# Patient Record
Sex: MALE | Race: WHITE | NOT HISPANIC OR LATINO | Employment: FULL TIME | URBAN - METROPOLITAN AREA
[De-identification: names, ages, dates, MRNs, and addresses within clinical notes are randomized per-mention and may not be internally consistent; named-entity substitution may affect disease eponyms.]

---

## 2022-01-07 ENCOUNTER — HOSPITAL ENCOUNTER (OUTPATIENT)
Facility: HOSPITAL | Age: 52
Discharge: HOME | End: 2022-01-08
Attending: EMERGENCY MEDICINE | Admitting: EMERGENCY MEDICINE
Payer: COMMERCIAL

## 2022-01-07 DIAGNOSIS — K62.5 BRIGHT RED BLOOD PER RECTUM: ICD-10-CM

## 2022-01-07 DIAGNOSIS — R79.89 ELEVATED LFTS: ICD-10-CM

## 2022-01-07 DIAGNOSIS — E87.20 LACTIC ACIDOSIS: ICD-10-CM

## 2022-01-07 DIAGNOSIS — R05.9 COUGH: ICD-10-CM

## 2022-01-07 DIAGNOSIS — K92.0 HEMATEMESIS WITH NAUSEA: Primary | ICD-10-CM

## 2022-01-07 DIAGNOSIS — R79.1 ELEVATED INR: ICD-10-CM

## 2022-01-07 PROBLEM — Z72.0 TOBACCO USER: Status: ACTIVE | Noted: 2022-01-07

## 2022-01-07 PROBLEM — F10.20 ALCOHOL DEPENDENCE: Chronic | Status: ACTIVE | Noted: 2022-01-07

## 2022-01-07 PROBLEM — Z72.0 TOBACCO USER: Chronic | Status: ACTIVE | Noted: 2022-01-07

## 2022-01-07 PROBLEM — F31.9 BIPOLAR DISORDER: Status: ACTIVE | Noted: 2022-01-07

## 2022-01-07 PROBLEM — E66.9 CLASS 1 OBESITY IN ADULT: Chronic | Status: ACTIVE | Noted: 2022-01-07

## 2022-01-07 PROBLEM — F31.9 BIPOLAR DISORDER: Chronic | Status: ACTIVE | Noted: 2022-01-07

## 2022-01-07 PROBLEM — K21.9 GASTROESOPHAGEAL REFLUX DISEASE: Status: ACTIVE | Noted: 2020-04-02

## 2022-01-07 PROBLEM — K21.9 GASTROESOPHAGEAL REFLUX DISEASE: Chronic | Status: ACTIVE | Noted: 2020-04-02

## 2022-01-07 LAB
ABO + RH BLD: NORMAL
ALBUMIN SERPL BCP-MCNC: 4 G/DL (ref 3.5–5.2)
ALP SERPL-CCNC: 87 U/L (ref 55–135)
ALT SERPL W/O P-5'-P-CCNC: 52 U/L (ref 10–44)
ANION GAP SERPL CALC-SCNC: 17 MMOL/L (ref 8–16)
AST SERPL-CCNC: 44 U/L (ref 10–40)
BASOPHILS # BLD AUTO: 0.07 K/UL (ref 0–0.2)
BASOPHILS # BLD AUTO: 0.08 K/UL (ref 0–0.2)
BASOPHILS NFR BLD: 0.6 % (ref 0–1.9)
BASOPHILS NFR BLD: 1 % (ref 0–1.9)
BILIRUB SERPL-MCNC: 1 MG/DL (ref 0.1–1)
BILIRUB UR QL STRIP: NEGATIVE
BLD GP AB SCN CELLS X3 SERPL QL: NORMAL
BUN SERPL-MCNC: 11 MG/DL (ref 6–20)
CALCIUM SERPL-MCNC: 8.9 MG/DL (ref 8.7–10.5)
CHLORIDE SERPL-SCNC: 104 MMOL/L (ref 95–110)
CLARITY UR: CLEAR
CO2 SERPL-SCNC: 22 MMOL/L (ref 23–29)
COLOR UR: YELLOW
CREAT SERPL-MCNC: 0.9 MG/DL (ref 0.5–1.4)
CTP QC/QA: YES
DIFFERENTIAL METHOD: ABNORMAL
DIFFERENTIAL METHOD: NORMAL
EOSINOPHIL # BLD AUTO: 0 K/UL (ref 0–0.5)
EOSINOPHIL # BLD AUTO: 0 K/UL (ref 0–0.5)
EOSINOPHIL NFR BLD: 0.1 % (ref 0–8)
EOSINOPHIL NFR BLD: 0.2 % (ref 0–8)
ERYTHROCYTE [DISTWIDTH] IN BLOOD BY AUTOMATED COUNT: 13.1 % (ref 11.5–14.5)
ERYTHROCYTE [DISTWIDTH] IN BLOOD BY AUTOMATED COUNT: 13.1 % (ref 11.5–14.5)
EST. GFR  (AFRICAN AMERICAN): >60 ML/MIN/1.73 M^2
EST. GFR  (NON AFRICAN AMERICAN): >60 ML/MIN/1.73 M^2
ETHANOL SERPL-MCNC: 274 MG/DL
GLUCOSE SERPL-MCNC: 111 MG/DL (ref 70–110)
GLUCOSE UR QL STRIP: NEGATIVE
HCT VFR BLD AUTO: 45.8 % (ref 40–54)
HCT VFR BLD AUTO: 50.3 % (ref 40–54)
HGB BLD-MCNC: 15.9 G/DL (ref 14–18)
HGB BLD-MCNC: 17.6 G/DL (ref 14–18)
HGB UR QL STRIP: NEGATIVE
IMM GRANULOCYTES # BLD AUTO: 0.02 K/UL (ref 0–0.04)
IMM GRANULOCYTES # BLD AUTO: 0.02 K/UL (ref 0–0.04)
IMM GRANULOCYTES NFR BLD AUTO: 0.2 % (ref 0–0.5)
IMM GRANULOCYTES NFR BLD AUTO: 0.2 % (ref 0–0.5)
INR PPP: 2.7 (ref 0.8–1.2)
KETONES UR QL STRIP: NEGATIVE
LACTATE SERPL-SCNC: 3.7 MMOL/L (ref 0.5–2.2)
LACTATE SERPL-SCNC: 5.4 MMOL/L (ref 0.5–2.2)
LACTATE SERPL-SCNC: 6.1 MMOL/L (ref 0.5–2.2)
LEUKOCYTE ESTERASE UR QL STRIP: NEGATIVE
LIPASE SERPL-CCNC: 28 U/L (ref 4–60)
LYMPHOCYTES # BLD AUTO: 2.5 K/UL (ref 1–4.8)
LYMPHOCYTES # BLD AUTO: 2.8 K/UL (ref 1–4.8)
LYMPHOCYTES NFR BLD: 21.3 % (ref 18–48)
LYMPHOCYTES NFR BLD: 33.8 % (ref 18–48)
MCH RBC QN AUTO: 30.9 PG (ref 27–31)
MCH RBC QN AUTO: 31 PG (ref 27–31)
MCHC RBC AUTO-ENTMCNC: 34.7 G/DL (ref 32–36)
MCHC RBC AUTO-ENTMCNC: 35 G/DL (ref 32–36)
MCV RBC AUTO: 88 FL (ref 82–98)
MCV RBC AUTO: 89 FL (ref 82–98)
MONOCYTES # BLD AUTO: 0.5 K/UL (ref 0.3–1)
MONOCYTES # BLD AUTO: 0.7 K/UL (ref 0.3–1)
MONOCYTES NFR BLD: 5.9 % (ref 4–15)
MONOCYTES NFR BLD: 6.1 % (ref 4–15)
NEUTROPHILS # BLD AUTO: 4.8 K/UL (ref 1.8–7.7)
NEUTROPHILS # BLD AUTO: 8.4 K/UL (ref 1.8–7.7)
NEUTROPHILS NFR BLD: 58.7 % (ref 38–73)
NEUTROPHILS NFR BLD: 71.9 % (ref 38–73)
NITRITE UR QL STRIP: NEGATIVE
NRBC BLD-RTO: 0 /100 WBC
NRBC BLD-RTO: 0 /100 WBC
PH UR STRIP: 6 [PH] (ref 5–8)
PLATELET # BLD AUTO: 372 K/UL (ref 150–450)
PLATELET # BLD AUTO: 406 K/UL (ref 150–450)
PMV BLD AUTO: 9.5 FL (ref 9.2–12.9)
PMV BLD AUTO: 9.7 FL (ref 9.2–12.9)
POTASSIUM SERPL-SCNC: 3.6 MMOL/L (ref 3.5–5.1)
PROT SERPL-MCNC: 7.9 G/DL (ref 6–8.4)
PROT UR QL STRIP: ABNORMAL
PROTHROMBIN TIME: 27.2 SEC (ref 9–12.5)
RBC # BLD AUTO: 5.13 M/UL (ref 4.6–6.2)
RBC # BLD AUTO: 5.7 M/UL (ref 4.6–6.2)
SARS-COV-2 RDRP RESP QL NAA+PROBE: NEGATIVE
SODIUM SERPL-SCNC: 143 MMOL/L (ref 136–145)
SP GR UR STRIP: 1.03 (ref 1–1.03)
URN SPEC COLLECT METH UR: ABNORMAL
UROBILINOGEN UR STRIP-ACNC: NEGATIVE EU/DL
WBC # BLD AUTO: 11.65 K/UL (ref 3.9–12.7)
WBC # BLD AUTO: 8.25 K/UL (ref 3.9–12.7)

## 2022-01-07 PROCEDURE — 82077 ASSAY SPEC XCP UR&BREATH IA: CPT | Performed by: EMERGENCY MEDICINE

## 2022-01-07 PROCEDURE — 96366 THER/PROPH/DIAG IV INF ADDON: CPT

## 2022-01-07 PROCEDURE — 83690 ASSAY OF LIPASE: CPT | Performed by: EMERGENCY MEDICINE

## 2022-01-07 PROCEDURE — 86850 RBC ANTIBODY SCREEN: CPT | Performed by: HOSPITALIST

## 2022-01-07 PROCEDURE — 96365 THER/PROPH/DIAG IV INF INIT: CPT

## 2022-01-07 PROCEDURE — C9113 INJ PANTOPRAZOLE SODIUM, VIA: HCPCS | Performed by: HOSPITALIST

## 2022-01-07 PROCEDURE — G0378 HOSPITAL OBSERVATION PER HR: HCPCS

## 2022-01-07 PROCEDURE — 25500020 PHARM REV CODE 255: Performed by: EMERGENCY MEDICINE

## 2022-01-07 PROCEDURE — 96375 TX/PRO/DX INJ NEW DRUG ADDON: CPT | Mod: 59

## 2022-01-07 PROCEDURE — 63600175 PHARM REV CODE 636 W HCPCS: Performed by: HOSPITALIST

## 2022-01-07 PROCEDURE — 96361 HYDRATE IV INFUSION ADD-ON: CPT

## 2022-01-07 PROCEDURE — 63600175 PHARM REV CODE 636 W HCPCS: Performed by: EMERGENCY MEDICINE

## 2022-01-07 PROCEDURE — 25000003 PHARM REV CODE 250: Performed by: EMERGENCY MEDICINE

## 2022-01-07 PROCEDURE — 83605 ASSAY OF LACTIC ACID: CPT | Performed by: EMERGENCY MEDICINE

## 2022-01-07 PROCEDURE — C9113 INJ PANTOPRAZOLE SODIUM, VIA: HCPCS | Performed by: EMERGENCY MEDICINE

## 2022-01-07 PROCEDURE — 81003 URINALYSIS AUTO W/O SCOPE: CPT | Performed by: EMERGENCY MEDICINE

## 2022-01-07 PROCEDURE — 25000003 PHARM REV CODE 250: Performed by: HOSPITALIST

## 2022-01-07 PROCEDURE — 85025 COMPLETE CBC W/AUTO DIFF WBC: CPT | Mod: 91 | Performed by: HOSPITALIST

## 2022-01-07 PROCEDURE — 99291 CRITICAL CARE FIRST HOUR: CPT | Mod: 25

## 2022-01-07 PROCEDURE — U0002 COVID-19 LAB TEST NON-CDC: HCPCS | Performed by: EMERGENCY MEDICINE

## 2022-01-07 PROCEDURE — 83605 ASSAY OF LACTIC ACID: CPT | Mod: 91 | Performed by: HOSPITALIST

## 2022-01-07 PROCEDURE — 96376 TX/PRO/DX INJ SAME DRUG ADON: CPT

## 2022-01-07 PROCEDURE — 80053 COMPREHEN METABOLIC PANEL: CPT | Performed by: EMERGENCY MEDICINE

## 2022-01-07 PROCEDURE — 99204 PR OFFICE/OUTPT VISIT, NEW, LEVL IV, 45-59 MIN: ICD-10-PCS | Mod: ,,, | Performed by: INTERNAL MEDICINE

## 2022-01-07 PROCEDURE — 85610 PROTHROMBIN TIME: CPT | Performed by: EMERGENCY MEDICINE

## 2022-01-07 PROCEDURE — 99204 OFFICE O/P NEW MOD 45 MIN: CPT | Mod: ,,, | Performed by: INTERNAL MEDICINE

## 2022-01-07 PROCEDURE — 85025 COMPLETE CBC W/AUTO DIFF WBC: CPT | Performed by: EMERGENCY MEDICINE

## 2022-01-07 RX ORDER — PANTOPRAZOLE SODIUM 40 MG/10ML
40 INJECTION, POWDER, LYOPHILIZED, FOR SOLUTION INTRAVENOUS
Status: COMPLETED | OUTPATIENT
Start: 2022-01-07 | End: 2022-01-07

## 2022-01-07 RX ORDER — OMEPRAZOLE 20 MG/1
20 CAPSULE, DELAYED RELEASE ORAL DAILY
COMMUNITY

## 2022-01-07 RX ORDER — MORPHINE SULFATE 4 MG/ML
4 INJECTION, SOLUTION INTRAMUSCULAR; INTRAVENOUS ONCE
Status: COMPLETED | OUTPATIENT
Start: 2022-01-07 | End: 2022-01-07

## 2022-01-07 RX ORDER — FOLIC ACID 1 MG/1
1 TABLET ORAL DAILY
Status: DISCONTINUED | OUTPATIENT
Start: 2022-01-08 | End: 2022-01-08 | Stop reason: HOSPADM

## 2022-01-07 RX ORDER — PANTOPRAZOLE SODIUM 40 MG/10ML
40 INJECTION, POWDER, LYOPHILIZED, FOR SOLUTION INTRAVENOUS 2 TIMES DAILY
Status: DISCONTINUED | OUTPATIENT
Start: 2022-01-07 | End: 2022-01-08 | Stop reason: HOSPADM

## 2022-01-07 RX ORDER — OXCARBAZEPINE 600 MG/1
600 TABLET, FILM COATED ORAL 2 TIMES DAILY
COMMUNITY
End: 2022-01-07

## 2022-01-07 RX ORDER — LORAZEPAM 2 MG/ML
1 INJECTION INTRAMUSCULAR
Status: COMPLETED | OUTPATIENT
Start: 2022-01-07 | End: 2022-01-07

## 2022-01-07 RX ORDER — SODIUM CHLORIDE 9 MG/ML
INJECTION, SOLUTION INTRAVENOUS CONTINUOUS
Status: DISCONTINUED | OUTPATIENT
Start: 2022-01-07 | End: 2022-01-08 | Stop reason: HOSPADM

## 2022-01-07 RX ORDER — LANOLIN ALCOHOL/MO/W.PET/CERES
100 CREAM (GRAM) TOPICAL DAILY
Status: DISCONTINUED | OUTPATIENT
Start: 2022-01-08 | End: 2022-01-08 | Stop reason: HOSPADM

## 2022-01-07 RX ORDER — MORPHINE SULFATE 4 MG/ML
6 INJECTION, SOLUTION INTRAMUSCULAR; INTRAVENOUS
Status: COMPLETED | OUTPATIENT
Start: 2022-01-07 | End: 2022-01-07

## 2022-01-07 RX ORDER — OCTREOTIDE ACETATE 100 UG/ML
100 INJECTION, SOLUTION INTRAVENOUS; SUBCUTANEOUS
Status: COMPLETED | OUTPATIENT
Start: 2022-01-07 | End: 2022-01-07

## 2022-01-07 RX ORDER — CIPROFLOXACIN 2 MG/ML
400 INJECTION, SOLUTION INTRAVENOUS
Status: DISCONTINUED | OUTPATIENT
Start: 2022-01-07 | End: 2022-01-08 | Stop reason: HOSPADM

## 2022-01-07 RX ORDER — IBUPROFEN 200 MG
200 TABLET ORAL EVERY 6 HOURS PRN
Status: ON HOLD | COMMUNITY
End: 2022-01-08 | Stop reason: HOSPADM

## 2022-01-07 RX ORDER — ONDANSETRON 2 MG/ML
4 INJECTION INTRAMUSCULAR; INTRAVENOUS
Status: COMPLETED | OUTPATIENT
Start: 2022-01-07 | End: 2022-01-07

## 2022-01-07 RX ORDER — OXCARBAZEPINE 150 MG/1
600 TABLET, FILM COATED ORAL 2 TIMES DAILY
Status: DISCONTINUED | OUTPATIENT
Start: 2022-01-07 | End: 2022-01-08 | Stop reason: HOSPADM

## 2022-01-07 RX ORDER — GABAPENTIN 300 MG/1
300 CAPSULE ORAL 2 TIMES DAILY
Status: DISCONTINUED | OUTPATIENT
Start: 2022-01-07 | End: 2022-01-08 | Stop reason: HOSPADM

## 2022-01-07 RX ORDER — DIAZEPAM 5 MG/1
5 TABLET ORAL EVERY 8 HOURS
Status: DISCONTINUED | OUTPATIENT
Start: 2022-01-07 | End: 2022-01-08 | Stop reason: HOSPADM

## 2022-01-07 RX ORDER — MORPHINE SULFATE 4 MG/ML
4 INJECTION, SOLUTION INTRAMUSCULAR; INTRAVENOUS
Status: COMPLETED | OUTPATIENT
Start: 2022-01-07 | End: 2022-01-07

## 2022-01-07 RX ORDER — GABAPENTIN 300 MG/1
300 CAPSULE ORAL
COMMUNITY
Start: 2021-12-15

## 2022-01-07 RX ADMIN — MORPHINE SULFATE 4 MG: 4 INJECTION, SOLUTION INTRAMUSCULAR; INTRAVENOUS at 01:01

## 2022-01-07 RX ADMIN — SODIUM CHLORIDE: 0.9 INJECTION, SOLUTION INTRAVENOUS at 06:01

## 2022-01-07 RX ADMIN — SODIUM CHLORIDE, SODIUM LACTATE, POTASSIUM CHLORIDE, AND CALCIUM CHLORIDE 1000 ML: .6; .31; .03; .02 INJECTION, SOLUTION INTRAVENOUS at 02:01

## 2022-01-07 RX ADMIN — OXCARBAZEPINE 600 MG: 150 TABLET, FILM COATED ORAL at 09:01

## 2022-01-07 RX ADMIN — MORPHINE SULFATE 4 MG: 4 INJECTION, SOLUTION INTRAMUSCULAR; INTRAVENOUS at 06:01

## 2022-01-07 RX ADMIN — PANTOPRAZOLE SODIUM 40 MG: 40 INJECTION, POWDER, FOR SOLUTION INTRAVENOUS at 03:01

## 2022-01-07 RX ADMIN — LORAZEPAM 1 MG: 2 INJECTION INTRAMUSCULAR; INTRAVENOUS at 04:01

## 2022-01-07 RX ADMIN — PANTOPRAZOLE SODIUM 40 MG: 40 INJECTION, POWDER, FOR SOLUTION INTRAVENOUS at 09:01

## 2022-01-07 RX ADMIN — OCTREOTIDE ACETATE 50 MCG/HR: 500 INJECTION, SOLUTION INTRAVENOUS; SUBCUTANEOUS at 04:01

## 2022-01-07 RX ADMIN — SODIUM CHLORIDE 1000 ML: 0.9 INJECTION, SOLUTION INTRAVENOUS at 01:01

## 2022-01-07 RX ADMIN — ONDANSETRON 4 MG: 2 INJECTION INTRAMUSCULAR; INTRAVENOUS at 01:01

## 2022-01-07 RX ADMIN — LORAZEPAM 1 MG: 2 INJECTION INTRAMUSCULAR; INTRAVENOUS at 03:01

## 2022-01-07 RX ADMIN — MORPHINE SULFATE 6 MG: 4 INJECTION, SOLUTION INTRAMUSCULAR; INTRAVENOUS at 03:01

## 2022-01-07 RX ADMIN — GABAPENTIN 300 MG: 300 CAPSULE ORAL at 09:01

## 2022-01-07 RX ADMIN — SODIUM CHLORIDE, SODIUM LACTATE, POTASSIUM CHLORIDE, AND CALCIUM CHLORIDE 1000 ML: .6; .31; .03; .02 INJECTION, SOLUTION INTRAVENOUS at 03:01

## 2022-01-07 RX ADMIN — OCTREOTIDE ACETATE 100 MCG: 100 INJECTION, SOLUTION INTRAVENOUS; SUBCUTANEOUS at 04:01

## 2022-01-07 RX ADMIN — DIAZEPAM 5 MG: 5 TABLET ORAL at 09:01

## 2022-01-07 RX ADMIN — IOHEXOL 100 ML: 350 INJECTION, SOLUTION INTRAVENOUS at 02:01

## 2022-01-07 NOTE — PHARMACY MED REC
"  Admission Medication History     The home medication history was taken by Julianne Randall CPhT.      You may go to "Admission" then "Reconcile Home Medications" tabs to review and/or act upon these items.      The home medication list has been updated by the Pharmacy department.    Please read ALL comments highlighted in yellow.    Please address this information as you see fit.     Feel free to contact us if you have any questions or require assistance.      The medications listed below were removed from the home medication list. Please reorder if appropriate:  Patient reports no longer taking the following medication(s):   Oxcarbazepine 600 mg          Patient verified taking Gabapentin po PRN about 2-3 weeks ago.    Patient takes Motrin OTC for pain and Omeprazole OTC about a week ago.          Julianne Randall CPhT.  [phone]                  .        "

## 2022-01-07 NOTE — ED PROVIDER NOTES
Encounter Date: 1/7/2022    SCRIBE #1 NOTE: I, Lara Roman, am scribing for, and in the presence of,  Wally Flanagan Jr., MD. I have scribed the following portions of the note - Other sections scribed: HPI, ROS.       History     Chief Complaint   Patient presents with    Abdominal Pain     EMS called to 50yo male with lower abdominal pain, vomiting blood and having bloody stools x3 days. Hx of heavy alcohol use per patient. EMS stated that he had a syncopal episode enroute that lasted about a minute and then he woke up and was alert and oriented x4 till arrival and at triage.      This 51 y.o male, with no medical history, presents to the ED via EMS transportation c/o bloody stools and emesis with associated lower abdominal pain for the last 3-4 days. Pt reports experiencing 1-2 episodes of bloody stools and 5-6 episodes of hematemesis per day since the onset of symptoms. He states that he has also been coughing up blood. The symptoms are acute, constant and severe (10/10). Pt reports that he has experienced similar bleeding in the past due to hemorrhoids, but notes that it has never been this severe. Pt is not followed by gastroenterology. Of note, he states that he has been consuming a 1/2 pint of alcohol per day for years. Pt consumed his last drink yesterday. He denies a history of alcohol withdrawal requiring hospitalization. He also denies shortness of breath, chest pain or any other associated symptoms. No alleviating factors.    The history is provided by the patient.     Review of patient's allergies indicates:   Allergen Reactions    Penicillins      Past Medical History:   Diagnosis Date    Alcohol abuse     Bloody stools     Hematemesis/vomiting blood      Past Surgical History:   Procedure Laterality Date    APPENDECTOMY      CHOLECYSTECTOMY      FOOT SURGERY Right     KNEE SURGERY Right      History reviewed. No pertinent family history.  Social History     Tobacco Use    Smoking  status: Current Every Day Smoker     Packs/day: 0.50     Types: Cigarettes    Smokeless tobacco: Never Used   Substance Use Topics    Alcohol use: Yes     Comment: 1/2 - 1 pint daily    Drug use: Not Currently     Review of Systems   Constitutional: Negative for fever.   HENT: Negative for sore throat.    Respiratory: Negative for shortness of breath.         (+) hemoptysis   Cardiovascular: Negative for chest pain.   Gastrointestinal: Positive for abdominal pain (lower), blood in stool and vomiting (with blood).   Genitourinary: Negative for dysuria.   Musculoskeletal: Negative for back pain.   Skin: Negative for rash.   Neurological: Negative for weakness.       Physical Exam     Initial Vitals [01/07/22 1231]   BP Pulse Resp Temp SpO2   134/87 85 16 97.9 °F (36.6 °C) 98 %      MAP       --         Physical Exam    Nursing note and vitals reviewed.  Constitutional: He appears well-developed and well-nourished. He is not diaphoretic. No distress.   HENT:   Head: Normocephalic and atraumatic.   Right Ear: External ear normal.   Left Ear: External ear normal.   Nose: Nose normal.   Mouth/Throat: Oropharynx is clear and moist.   Eyes: Conjunctivae and EOM are normal. Pupils are equal, round, and reactive to light. Right eye exhibits no discharge. Left eye exhibits no discharge. No scleral icterus.   Neck: Neck supple. No JVD present.   Normal range of motion.  Cardiovascular: Normal rate, regular rhythm, normal heart sounds and intact distal pulses. Exam reveals no gallop and no friction rub.    No murmur heard.  Pulmonary/Chest: Breath sounds normal. No stridor. No respiratory distress. He has no wheezes. He has no rhonchi. He has no rales. He exhibits no tenderness.   Abdominal: Abdomen is soft. Bowel sounds are normal. He exhibits no distension and no mass. There is abdominal tenderness.   Patient's abdomen is soft and nontender.  There is a surgical scar in the right lower quadrant is consistent with  appendectomy.  The patient does have some tenderness in the lower abdomen mostly in that it mid suprapubic area. There is no rebound and no guarding.   Musculoskeletal:         General: No tenderness or edema. Normal range of motion.      Cervical back: Normal range of motion and neck supple.     Neurological: He is alert and oriented to person, place, and time. He has normal strength. No cranial nerve deficit or sensory deficit.   Skin: Skin is warm and dry. No rash noted. No erythema. No pallor.   Psychiatric: He has a normal mood and affect. His behavior is normal. Judgment and thought content normal.         ED Course   Critical Care    Date/Time: 1/7/2022 4:19 PM  Performed by: Wally Flanagan Jr., MD  Authorized by: Wally Flanagan Jr., MD   Direct patient critical care time: 15 minutes  Additional history critical care time: 10 minutes  Ordering / reviewing critical care time: 5 minutes  Documentation critical care time: 5 minutes  Consulting other physicians critical care time: 5 minutes  Total critical care time (exclusive of procedural time) : 40 minutes  Critical care was necessary to treat or prevent imminent or life-threatening deterioration of the following conditions: dehydration.  Critical care was time spent personally by me on the following activities: development of treatment plan with patient or surrogate, discussions with consultants, interpretation of cardiac output measurements, evaluation of patient's response to treatment, review of old charts, re-evaluation of patient's condition, ordering and review of radiographic studies, ordering and review of laboratory studies, pulse oximetry, ordering and performing treatments and interventions, obtaining history from patient or surrogate and examination of patient.        Labs Reviewed   COMPREHENSIVE METABOLIC PANEL - Abnormal; Notable for the following components:       Result Value    CO2 22 (*)     Glucose 111 (*)     AST 44 (*)     ALT 52  (*)     Anion Gap 17 (*)     All other components within normal limits   LACTIC ACID, PLASMA - Abnormal; Notable for the following components:    Lactate (Lactic Acid) 6.1 (*)     All other components within normal limits    Narrative:      Lactic Acid  critical result(s) called and verbal readback obtained   from [facility]. by [email or web address] 01/07/2022 13:52   ALCOHOL,MEDICAL (ETHANOL) - Abnormal; Notable for the following components:    Alcohol, Serum 274 (*)     All other components within normal limits   PROTIME-INR - Abnormal; Notable for the following components:    Prothrombin Time 27.2 (*)     INR 2.7 (*)     All other components within normal limits   URINALYSIS, REFLEX TO URINE CULTURE - Abnormal; Notable for the following components:    Protein, UA Trace (*)     All other components within normal limits    Narrative:     Specimen Source->Urine   CBC W/ AUTO DIFFERENTIAL   LIPASE   LACTIC ACID, PLASMA   SARS-COV-2 RDRP GENE    Narrative:     This test utilizes isothermal nucleic acid amplification   technology to detect the SARS-CoV-2 RdRp nucleic acid segment.   The analytical sensitivity (limit of detection) is 125 genome   equivalents/mL.   A POSITIVE result implies infection with the SARS-CoV-2 virus;   the patient is presumed to be contagious.     A NEGATIVE result means that SARS-CoV-2 nucleic acids are not   present above the limit of detection. A NEGATIVE result should be   treated as presumptive. It does not rule out the possibility of   COVID-19 and should not be the sole basis for treatment decisions.   If COVID-19 is strongly suspected based on clinical and exposure   history, re-testing using an alternate molecular assay should be   considered.   This test is only for use under the Food and Drug   Administration s Emergency Use Authorization (EUA).   Commercial kits are provided by "[employer].   Performance characteristics of the EUA have been independently   verified by [facility]  Department of   Pathology and Laboratory Medicine.   _________________________________________________________________   The authorized Fact Sheet for Healthcare Providers and the authorized Fact   Sheet for Patients of the ID NOW COVID-19 are available on the FDA   website:     https://www.fda.gov/media/305589/download  https://www.fda.gov/media/478830/download                Imaging Results          CTA Abdomen and Pelvis (Final result)  Result time 01/07/22 14:44:49   Procedure changed from CT Abdomen Pelvis With Contrast     Final result by Alessandro Cristina IV, MD (01/07/22 14:44:49)                 Impression:      No acute intra-abdominal abnormality.    Few scattered colonic diverticula without evidence acute diverticulitis.    Additional findings as above.      Electronically signed by: Alessandro Cristina  Date:    01/07/2022  Time:    14:44             Narrative:    EXAMINATION:  CTA ABDOMEN AND PELVIS    CLINICAL HISTORY:  LLQ abdominal pain;    TECHNIQUE:  CTA abdomen/pelvis performed after administration of 100 mL IV Omnipaque 350.  Coronal and sagittal reformats provided.    COMPARISON:  Portal chest radiograph from earlier today.    FINDINGS:  Bilateral lung bases demonstrate diffuse patchy ground-glass and few scattered small bandlike opacities.  Findings likely represent sequela of respiratory motion and/or subsegmental atelectasis.  Infectious/inflammatory etiology or edema could have a similar appearance.  Recommend clinical correlation.  No focal consolidation or mass.  No significant pleural fluid.    Partially visualized heart is unremarkable.  No significant pericardial fluid.    Liver is normal in size location.  No focal hepatic lesion noting limited assessment due to CTA technique.    Gallbladder is surgically absent.  No significant intra or extrahepatic biliary duct dilatation.    Pancreas, spleen, bilateral adrenal glands are unremarkable.    Kidneys are normal in size and location.   Normal contrast enhancement.  No hydronephrosis.  No hydroureter.    Bladder is mildly distended.  No focal wall thickening.    Mild Prostatomegaly.    Normal caliber small bowel.  Small volume stool throughout the colon.  Few scattered colonic diverticuli within the sigmoid colon without evidence of acute diverticulitis.  No evidence of bowel obstruction or inflammation.  Appendix is not definitively visualized; however, there is no pericecal inflammatory changes.    No evidence of free intraperitoneal air or ascites.    No retroperitoneal lymphadenopathy.    Abdominal aorta demonstrates normal caliber and course.  No significant atherosclerosis.  No aneurysm.  Celiac artery, SMA, bilateral renal arteries, JOSE, and bilateral common iliac arteries are patent.    No significant soft tissue abnormality.    Degenerative changes throughout the spine.  No acute fracture.  No aggressive osseous lesion.                               X-Ray Chest AP Portable (Final result)  Result time 01/07/22 13:13:27    Final result by Charles Lara MD (01/07/22 13:13:27)                 Impression:      1. No acute cardiopulmonary process.      Electronically signed by: Charles Lara MD  Date:    01/07/2022  Time:    13:13             Narrative:    EXAMINATION:  XR CHEST AP PORTABLE    CLINICAL HISTORY:  Cough, unspecified    TECHNIQUE:  Single frontal view of the chest was performed.    COMPARISON:  None    FINDINGS:  The cardiomediastinal silhouette is not enlarged.  There is no pleural effusion.  The trachea is midline.  The lungs are symmetrically expanded bilaterally without evidence of acute parenchymal process. No large focal consolidation seen.  There is no pneumothorax.  The osseous structures are remarkable for degenerative changes..                              X-Rays:   Independently Interpreted Readings:   Other Readings:  Chest x-ray is clear without evidence of infiltrate, consolidation, pleural effusion, pulmonary  edema, or pneumothorax.    Medications   octreotide (SANDOSTATIN) 500 mcg in sodium chloride 0.9% 100 mL infusion (50 mcg/hr Intravenous New Bag 1/7/22 1612)   lactated ringers bolus 1,000 mL (1,000 mLs Intravenous New Bag 1/7/22 1556)   sodium chloride 0.9% bolus 1,000 mL (0 mLs Intravenous Stopped 1/7/22 1415)   ondansetron injection 4 mg (4 mg Intravenous Given 1/7/22 1313)   morphine injection 4 mg (4 mg Intravenous Given 1/7/22 1323)   lactated ringers bolus 1,000 mL (0 mLs Intravenous Stopped 1/7/22 1543)   iohexoL (OMNIPAQUE 350) injection 100 mL (100 mLs Intravenous Given 1/7/22 1412)   morphine injection 6 mg (6 mg Intravenous Given 1/7/22 1508)   pantoprazole injection 40 mg (40 mg Intravenous Given 1/7/22 1542)   lorazepam injection 1 mg (1 mg Intravenous Given 1/7/22 1542)   octreotide injection 100 mcg (100 mcg Intravenous Given 1/7/22 1609)     Medical Decision Making:   ED Management:  This is the emergent evaluation of a 51-year-old male presents emergency department for evaluation of bright red blood per rectum as well as bloody emesis.  He also states he coughed some blood up.  Differential diagnosis at the time of initial evaluation included, but was not limited to:    Elva-Lynch tear, peptic ulcer disease, esophageal varices, hemorrhoids, diverticulosis, ischemic bowel, arteriovenous malformation, angiodysplasia.  Rectal examination reveals scant stool in the rectal vault.  Difficult to determine color.  Guaiac negative on card.  Patient had mild tenderness in the suprapubic area the abdomen.  It appears to be intermittent and spasmodic in nature.  Patient had significant elevation of lactate of 6.1.  No was concerned that this could be ischemic bowel causing bright red blood per rectum.  CT angiogram of the abdomen and pelvis was done which revealed no evidence of this.  The patient has no clinical evidence of significant alcohol withdrawal at this time.  Ethanol level is 274.    Patient does  not show overt signs of withdrawal right now.  However, he has had episodes of tachycardia.  I will treat him with rales band.  I have also given him analgesia for is abdominal pain.  Due to pain that seemed out of proportion initially to examination and with lactate elevated to 6.1 and bloody stool, I ordered a CT angiogram of the abdomen and pelvis to evaluate for possible bowel ischemia.  This did not reveal any acute findings including findings of bowel ischemia.  Chest x-ray clear.  Patient has not had any episodes of bloody bowel movements or hematemesis in the emergency department.  Given his risk factors of significant alcohol use as well as possible bright red hematemesis by history, I did contact Gastroenterology.  Dr. peter recommends giving the patient twice daily IV PPI which I have started.  Also recommends octreotide bolus and drip if there is any question of hematemesis in this patient who could have underlying esophageal varices.  We discussed possibility of ICU admission but we both feel this patient is stable for the floor at this time.  He is hemoconcentrated and I suspect his elevated lactate is not related to infection based on no fever, leukocytosis, or source of infection found.  I think this is likely a combination of dehydration as well as significant ethanol use.  Patient likely needs hydration, trending of lactate an H/H, and GI consult.  This is all been started in the emergency department.  I will repeat the lactate given the patient has received IV fluids to ensure that it is starting to improve.  With regard to the patient's liver function, his total bilirubin is normal.  There is a mild transaminitis.  He does have a INR of 2.7.  Patient states he is on no medications except for gabapentin for chronic pain.  Case discussed with the observation hospitalist team.          Scribe Attestation:   Scribe #1: I performed the above scribed service and the documentation accurately describes  the services I performed. I attest to the accuracy of the note.                 Clinical Impression:   Final diagnoses:  [R05.9] Cough  [K92.0] Hematemesis with nausea (Primary)  [K62.5] Bright red blood per rectum  [E87.2] Lactic acidosis  [R79.1] Elevated INR          ED Disposition Condition    Observation               I, Wally Flanagan MD, personally performed the services described in this documentation. All medical record entries made by the scribe were at my direction and in my presence. I have reviewed the chart and agree that the record reflects my personal performance and is accurate and complete.     Wally Flanagan Jr., MD  01/07/22 3990

## 2022-01-07 NOTE — ED TRIAGE NOTES
Pt arrives via EMS complaining of 10/10 RLQ pain for 3-4 days that started at private residence. Pt states that he also has blood in his stool and blood in his vomit. Pt is poor historian regards to how many occurences he has had. Pt states that drinks 1 pint per day of alcohol with his last drink being last night. Pt denies any fever or any other symptoms at this time.

## 2022-01-08 VITALS
HEART RATE: 98 BPM | WEIGHT: 215 LBS | DIASTOLIC BLOOD PRESSURE: 79 MMHG | TEMPERATURE: 99 F | HEIGHT: 68 IN | BODY MASS INDEX: 32.58 KG/M2 | OXYGEN SATURATION: 98 % | SYSTOLIC BLOOD PRESSURE: 143 MMHG | RESPIRATION RATE: 19 BRPM

## 2022-01-08 LAB
BASOPHILS # BLD AUTO: 0.09 K/UL (ref 0–0.2)
BASOPHILS NFR BLD: 1.1 % (ref 0–1.9)
DIFFERENTIAL METHOD: ABNORMAL
EOSINOPHIL # BLD AUTO: 0 K/UL (ref 0–0.5)
EOSINOPHIL NFR BLD: 0.1 % (ref 0–8)
ERYTHROCYTE [DISTWIDTH] IN BLOOD BY AUTOMATED COUNT: 13.1 % (ref 11.5–14.5)
HCT VFR BLD AUTO: 44.5 % (ref 40–54)
HGB BLD-MCNC: 15.5 G/DL (ref 14–18)
IMM GRANULOCYTES # BLD AUTO: 0.02 K/UL (ref 0–0.04)
IMM GRANULOCYTES NFR BLD AUTO: 0.2 % (ref 0–0.5)
LACTATE SERPL-SCNC: 1.1 MMOL/L (ref 0.5–2.2)
LYMPHOCYTES # BLD AUTO: 1.6 K/UL (ref 1–4.8)
LYMPHOCYTES NFR BLD: 18.9 % (ref 18–48)
MCH RBC QN AUTO: 31.3 PG (ref 27–31)
MCHC RBC AUTO-ENTMCNC: 34.8 G/DL (ref 32–36)
MCV RBC AUTO: 90 FL (ref 82–98)
MONOCYTES # BLD AUTO: 0.7 K/UL (ref 0.3–1)
MONOCYTES NFR BLD: 8.8 % (ref 4–15)
NEUTROPHILS # BLD AUTO: 6 K/UL (ref 1.8–7.7)
NEUTROPHILS NFR BLD: 70.9 % (ref 38–73)
NRBC BLD-RTO: 0 /100 WBC
PLATELET # BLD AUTO: 290 K/UL (ref 150–450)
PMV BLD AUTO: 10.1 FL (ref 9.2–12.9)
RBC # BLD AUTO: 4.96 M/UL (ref 4.6–6.2)
WBC # BLD AUTO: 8.43 K/UL (ref 3.9–12.7)

## 2022-01-08 PROCEDURE — 99214 PR OFFICE/OUTPT VISIT, EST, LEVL IV, 30-39 MIN: ICD-10-PCS | Mod: ,,, | Performed by: INTERNAL MEDICINE

## 2022-01-08 PROCEDURE — 63600175 PHARM REV CODE 636 W HCPCS: Mod: JA | Performed by: EMERGENCY MEDICINE

## 2022-01-08 PROCEDURE — C9113 INJ PANTOPRAZOLE SODIUM, VIA: HCPCS | Performed by: HOSPITALIST

## 2022-01-08 PROCEDURE — 87340 HEPATITIS B SURFACE AG IA: CPT | Performed by: INTERNAL MEDICINE

## 2022-01-08 PROCEDURE — 86803 HEPATITIS C AB TEST: CPT | Performed by: INTERNAL MEDICINE

## 2022-01-08 PROCEDURE — 96376 TX/PRO/DX INJ SAME DRUG ADON: CPT

## 2022-01-08 PROCEDURE — 85025 COMPLETE CBC W/AUTO DIFF WBC: CPT | Performed by: HOSPITALIST

## 2022-01-08 PROCEDURE — 25000003 PHARM REV CODE 250: Performed by: HOSPITALIST

## 2022-01-08 PROCEDURE — 99214 OFFICE O/P EST MOD 30 MIN: CPT | Mod: ,,, | Performed by: INTERNAL MEDICINE

## 2022-01-08 PROCEDURE — 63600175 PHARM REV CODE 636 W HCPCS: Performed by: HOSPITALIST

## 2022-01-08 PROCEDURE — G0378 HOSPITAL OBSERVATION PER HR: HCPCS

## 2022-01-08 PROCEDURE — 86704 HEP B CORE ANTIBODY TOTAL: CPT | Performed by: INTERNAL MEDICINE

## 2022-01-08 PROCEDURE — 63600175 PHARM REV CODE 636 W HCPCS: Performed by: INTERNAL MEDICINE

## 2022-01-08 PROCEDURE — 86706 HEP B SURFACE ANTIBODY: CPT | Performed by: INTERNAL MEDICINE

## 2022-01-08 PROCEDURE — 83605 ASSAY OF LACTIC ACID: CPT | Performed by: HOSPITALIST

## 2022-01-08 PROCEDURE — 86790 VIRUS ANTIBODY NOS: CPT | Performed by: INTERNAL MEDICINE

## 2022-01-08 PROCEDURE — 25000003 PHARM REV CODE 250: Performed by: EMERGENCY MEDICINE

## 2022-01-08 PROCEDURE — 96366 THER/PROPH/DIAG IV INF ADDON: CPT

## 2022-01-08 PROCEDURE — 96365 THER/PROPH/DIAG IV INF INIT: CPT | Mod: 59

## 2022-01-08 PROCEDURE — 36415 COLL VENOUS BLD VENIPUNCTURE: CPT | Performed by: HOSPITALIST

## 2022-01-08 RX ORDER — ACETAMINOPHEN 325 MG/1
650 TABLET ORAL EVERY 6 HOURS PRN
Status: DISCONTINUED | OUTPATIENT
Start: 2022-01-08 | End: 2022-01-08

## 2022-01-08 RX ORDER — OXCARBAZEPINE 600 MG/1
600 TABLET, FILM COATED ORAL 2 TIMES DAILY
Start: 2022-01-08

## 2022-01-08 RX ORDER — LANOLIN ALCOHOL/MO/W.PET/CERES
100 CREAM (GRAM) TOPICAL DAILY
Start: 2022-01-09

## 2022-01-08 RX ORDER — OXYCODONE AND ACETAMINOPHEN 5; 325 MG/1; MG/1
1 TABLET ORAL EVERY 4 HOURS PRN
Status: DISCONTINUED | OUTPATIENT
Start: 2022-01-08 | End: 2022-01-08 | Stop reason: HOSPADM

## 2022-01-08 RX ORDER — FOLIC ACID 1 MG/1
1 TABLET ORAL DAILY
Refills: 0
Start: 2022-01-09 | End: 2023-01-09

## 2022-01-08 RX ADMIN — OXCARBAZEPINE 600 MG: 150 TABLET, FILM COATED ORAL at 08:01

## 2022-01-08 RX ADMIN — PANTOPRAZOLE SODIUM 40 MG: 40 INJECTION, POWDER, FOR SOLUTION INTRAVENOUS at 08:01

## 2022-01-08 RX ADMIN — PHYTONADIONE 10 MG: 10 INJECTION, EMULSION INTRAMUSCULAR; INTRAVENOUS; SUBCUTANEOUS at 11:01

## 2022-01-08 RX ADMIN — DIAZEPAM 5 MG: 5 TABLET ORAL at 05:01

## 2022-01-08 RX ADMIN — GABAPENTIN 300 MG: 300 CAPSULE ORAL at 08:01

## 2022-01-08 RX ADMIN — ACETAMINOPHEN 650 MG: 325 TABLET ORAL at 02:01

## 2022-01-08 RX ADMIN — THIAMINE HCL TAB 100 MG 100 MG: 100 TAB at 08:01

## 2022-01-08 RX ADMIN — CIPROFLOXACIN 400 MG: 2 INJECTION, SOLUTION INTRAVENOUS at 08:01

## 2022-01-08 RX ADMIN — FOLIC ACID 1 MG: 1 TABLET ORAL at 08:01

## 2022-01-08 RX ADMIN — OCTREOTIDE ACETATE 50 MCG/HR: 500 INJECTION, SOLUTION INTRAVENOUS; SUBCUTANEOUS at 03:01

## 2022-01-08 RX ADMIN — THERA TABS 1 TABLET: TAB at 08:01

## 2022-01-08 RX ADMIN — CIPROFLOXACIN 400 MG: 2 INJECTION, SOLUTION INTRAVENOUS at 12:01

## 2022-01-08 NOTE — PLAN OF CARE
Follow-up Information     IR[facility]. Schedule an appointment as soon as possible for a visit in 1 week.    Why: Follow up with primary care   Contact information:  [address]  [facility] [id number]             Schedule an appointment as soon as possible for a visit with [facility].    Specialty: Gastroenterology  Why:  Follow up with GI back home to proceed with EGD and colonoscopy  Contact information:  [address] [facility] [address]  [place] [address] [phone]  Additional information:  Please park in garage or surface lot and use Medical Office Bldg entrance. Check in at [address]                   WRITTEN HEALTHCARE DISCHARGE INFORMATION:      Things that YOU are RESPONSIBLE for to help Manage Your Care At Home:   1. Getting your prescriptions filled.   2. Taking you medications as directed. DO NOT MISS ANY DOSES!   3. Going to your follow-up doctor appointments. This is important because it allows the doctor to monitor your progress and to determine if any changes need to be made to your treatment plan.       If you are unable to make your follow up appointments, please call the number listed and reschedule this appointment.      ____________HELP AT HOME____________________     Experiencing any SYMPTOMS or PROBLEMS: YOU CAN   Schedule a same day appointment with your Primary Care Doctor or   you can call [facility] On Call Nurse Care Line for 24/7 assistance at [phone]     If you experience any SYMPTOMS or PROBLEMS that have become severe, Call 911 and come to your nearest Emergency Room.     Thank you for choosing [facility] and allowing us to care for you.   From your care management team:   You may receive a call from [facility]charge [facility] within 48-72 hours to help manage your care after discharge. Please try to make sure that you answer your phone for this important phone call.

## 2022-01-08 NOTE — HPI
51 y.o. 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  Acute onset of symptoms, rated as severe.  He reports drinking heavily daily.  Has had an EGD, but thinks it has been greater than 5 years.  Denies fever, chills, cough, SOB, chest pain, palpitations, orthopnea, PND, dizziness, syncope.  In the ED, he was noted to have an elevated INR of 2.7 and an elevated lactic acid of 6.1.  H/H within normal limits and no further bleeding observed in the ED.  only minimal elevation of AST/ALT.  Alcohol level 274. States his last drink was yesterday.  Otherwise labs, UA, COVID test, chest x-ray, CTA abdomen pelvis all unremarkable for acute abnormality.  He received IV fluids and lactic is starting to improve.  Gastroenterology was consulted.  Placed in observation.

## 2022-01-08 NOTE — HOSPITAL COURSE
51 y.o. 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  Acute onset of symptoms, rated as severe.  He reports drinking heavily daily.  Has had an EGD, but thinks it has been greater than 5 years.  Denies fever, chills, cough, SOB, chest pain, palpitations, orthopnea, PND, dizziness, syncope.  In the ED, he was noted to have an elevated INR of 2.7 and an elevated lactic acid of 6.1.  H/H within normal limits and no further bleeding observed in the ED.  only minimal elevation of AST/ALT.  Alcohol level 274. States his last drink was yesterday.  Otherwise labs, UA, COVID test, chest x-ray, CTA abdomen pelvis all unremarkable for acute abnormality.  He received IV fluids and lactic is starting to improve.  Gastroenterology was consulted.  Placed in observation.   His symptoms resolved,GI started on clear diet and was gradually advanced,HH remains stable.patient was discharged home with recriminations for out patient EGD and colonoscopy by primary  GI

## 2022-01-08 NOTE — DISCHARGE SUMMARY
[facility] Medicine  Discharge Summary      Patient Name: Kevin Montanez  MRN: [id number]  Patient Class: OP- Observation  Admission Date: 1/7/2022  Hospital Length of Stay: 1 day   Discharge Date and Time:  01/08/2022 12:41 PM  Attending Physician: Dianne Luu MD   Discharging Provider: Dianne Luu MD  Primary Care Provider: IRVING Mcdowell      HPI:   51 y.o. 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  Acute onset of symptoms, rated as severe.  He reports drinking heavily daily.  Has had an EGD, but thinks it has been greater than 5 years.  Denies fever, chills, cough, SOB, chest pain, palpitations, orthopnea, PND, dizziness, syncope.  In the ED, he was noted to have an elevated INR of 2.7 and an elevated lactic acid of 6.1.  H/H within normal limits and no further bleeding observed in the ED.  only minimal elevation of AST/ALT.  Alcohol level 274. States his last drink was yesterday.  Otherwise labs, UA, COVID test, chest x-ray, CTA abdomen pelvis all unremarkable for acute abnormality.  He received IV fluids and lactic is starting to improve.  Gastroenterology was consulted.  Placed in observation.      * No surgery found *      Hospital Course:   51 y.o. 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  Acute onset of symptoms, rated as severe.  He reports drinking heavily daily.  Has had an EGD, but thinks it has been greater than 5 years.  Denies fever, chills, cough, SOB, chest pain, palpitations, orthopnea, PND, dizziness, syncope.  In the  ED, he was noted to have an elevated INR of 2.7 and an elevated lactic acid of 6.1.  H/H within normal limits and no further bleeding observed in the ED.  only minimal elevation of AST/ALT.  Alcohol level 274. States his last drink was yesterday.  Otherwise labs, UA, COVID test, chest x-ray, CTA abdomen pelvis all unremarkable for acute abnormality.  He received IV fluids and lactic is starting to improve.  Gastroenterology was consulted.  Placed in observation.   His symptoms resolved,GI started on clear diet and was gradually advanced,HH remains stable.patient was discharged home with recriminations for out patient EGD and colonoscopy by primary  GI        Goals of Care Treatment Preferences:  Code Status: Full Code      Consults:   Consults (From admission, onward)        Status Ordering Provider     Inpatient consult to Gastroenterology  Once        Provider:  Gerda Resendiz MD    Completed YADIRA QUILES JR          No new Assessment & Plan notes have been filed under this hospital service since the last note was generated.  Service: Hospital Medicine    Final Active Diagnoses:    Diagnosis Date Noted POA    PRINCIPAL PROBLEM:  Hematemesis with nausea [K92.0] 01/07/2022 Yes    Bipolar disorder [F31.9] 01/07/2022 Yes     Chronic    Tobacco user [Z72.0] 01/07/2022 Yes     Chronic    Alcohol dependence [F10.20] 01/07/2022 Yes     Chronic    Class 1 obesity in adult [E66.9] 01/07/2022 Yes     Chronic    Gastroesophageal reflux disease [K21.9] 04/02/2020 Yes     Chronic      Problems Resolved During this Admission:       Discharged Condition: stable    Disposition: Home or Self Care    Follow Up:   Follow-up Information     IRVING Hong Schedule an appointment as soon as possible for a visit in 1 week.    Why: Follow up with primary care   Contact information:  [address] [place] [id number]             Schedule an appointment as soon as possible for a visit with [facility].     Specialty: Gastroenterology  Why:  Follow up with GI back home to proceed with EGD and colonoscopy  Contact information:  [address] [facility] [address]  [place] [address] [phone]  Additional information:  Please park in garage or surface lot and use Medical Office Bldg entrance. Check in at [address]           [facility] [place].    Why: Primary care   Contact information:  [address] [place] [address] [phone]                       Patient Instructions:      Activity as tolerated       Significant Diagnostic Studies: Labs:   BMP:   Recent Labs   Lab 01/07/22  1308   *      K 3.6      CO2 22*   BUN 11   CREATININE 0.9   CALCIUM 8.9   , CMP   Recent Labs   Lab 01/07/22  1308      K 3.6      CO2 22*   *   BUN 11   CREATININE 0.9   CALCIUM 8.9   PROT 7.9   ALBUMIN 4.0   BILITOT 1.0   ALKPHOS 87   AST 44*   ALT 52*   ANIONGAP 17*   ESTGFRAFRICA >60   EGFRNONAA >60    and CBC   Recent Labs   Lab 01/07/22  1308 01/07/22  1308 01/07/22  1731 01/07/22  1731 01/08/22  0527   WBC 8.25  --  11.65  --  8.43   HGB 17.6  --  15.9  --  15.5   HCT 50.3   < > 45.8   < > 44.5     --  372  --  290    < > = values in this interval not displayed.     Radiology: X-Ray: CXR: X-Ray Chest 1 View (CXR): No results found for this visit on 01/07/22. and X-Ray Chest PA and Lateral (CXR): No results found for this visit on 01/07/22.  CT scan: CT ABDOMEN PELVIS WITH CONTRAST: No results found for this visit on 01/07/22. and CT ABDOMEN PELVIS WITHOUT CONTRAST: No results found for this visit on 01/07/22.    Pending Diagnostic Studies:     Procedure Component Value Units Date/Time    CBC auto differential [[id number]]     Order Status: Sent Lab Status: No result     Specimen: Blood     CBC auto differential [[id number]]     Order Status: Sent Lab Status: No result     Specimen: Blood     Hepatitis A antibody, IgG [[id number]] Collected: 01/08/22 0527    Order Status: Sent Lab  Status: In process Updated: 01/08/22 0527    Specimen: Blood     Hepatitis B core antibody, total [[id number]] Collected: 01/08/22 0527    Order Status: Sent Lab Status: In process Updated: 01/08/22 0527    Specimen: Blood     Hepatitis B surface antibody [[id number]] Collected: 01/08/22 0527    Order Status: Sent Lab Status: In process Updated: 01/08/22 0527    Specimen: Blood     Hepatitis B surface antigen [[id number]] Collected: 01/08/22 0527    Order Status: Sent Lab Status: In process Updated: 01/08/22 0527    Specimen: Blood     Hepatitis C antibody [[id number]] Collected: 01/08/22 0527    Order Status: Sent Lab Status: In process Updated: 01/08/22 0527    Specimen: Blood     Protime-INR [[id number]]     Order Status: Sent Lab Status: No result     Specimen: Blood          Medications:  Reconciled Home Medications:      Medication List      START taking these medications    folic acid 1 MG tablet  Commonly known as: FOLVITE  Take 1 tablet (1 mg total) by mouth once daily.  Start taking on: January 9, 2022     multivitamin Tab  Take 1 tablet by mouth once daily.  Start taking on: January 9, 2022     thiamine 100 MG tablet  Take 1 tablet (100 mg total) by mouth once daily.  Start taking on: January 9, 2022        CONTINUE taking these medications    gabapentin 300 MG capsule  Commonly known as: NEURONTIN  Take 300 mg by mouth as needed.     omeprazole 20 MG capsule  Commonly known as: PRILOSEC  Take 20 mg by mouth once daily.     OXcarbazepine 600 MG Tab  Commonly known as: TrileptaL  Take 1 tablet (600 mg total) by mouth 2 (two) times a day.        STOP taking these medications    ibuprofen 200 MG tablet  Commonly known as: ADVIL,MOTRIN            Indwelling Lines/Drains at time of discharge:   Lines/Drains/Airways     None                 Time spent on the discharge of patient: over 30  minutes         Dianne Luu MD  Department of Hospital Medicine  [facility]

## 2022-01-08 NOTE — ASSESSMENT & PLAN NOTE
Concerning history with alcohol use, INR elevated but otherwise lab work indicates no acute liver issues.  H/H within normal limits and hemodynamically stable.  Continue PPI, octreotide, and Cipro.  NPO.  Gastroenterology consulted, appreciate recs.

## 2022-01-08 NOTE — CONSULTS
"[facility] Note    CC: abdominal pain    HPI 51 y.o. male with past medical history of Feng's esophagus, daily alcohol use, daily NSAID use who presents with three days of worsening, lower, moderate to severe abdominal pain with associated hematochezia and hematemesis on the day prior to presentation.  His abdominal pain persists but he has not had further episodes of hematochezia or hematemesis.  He does see dark stools intermittently.    His abdominal pain is lower abdominal pain and worsens with movement and straining to urinate.  He does have to strain at times with urination.    He has taken NSAIDS daily, ibuprofen for years due to joint aches and pains.    He takes frequent antacids and PRN Omeprazole for his GERD symptoms.    For the past few months he has had dysphagia to solids and liquids with liquids being more difficult to swallow.    He has not had a colonoscopy.    He drinks about 4 shots of liquor daily.    The patient is from [place].  He is here working on [employer] recovery.    He has a history of Feng's esophagus.  His last endoscopy was about 7 years ago.    He has a family history of colon cancer in his father who was diagnosed over the age of 59yo.    Chart reviewed and summarized here.    Past Medical History  Past Medical History:   Diagnosis Date    Alcohol abuse     Bloody stools     Hematemesis/vomiting blood        Past Surgical History  Past Surgical History:   Procedure Laterality Date    APPENDECTOMY      CHOLECYSTECTOMY      FOOT SURGERY Right     KNEE SURGERY Right        Social History  Social History     Tobacco Use    Smoking status: Never Smoker    Smokeless tobacco: Current User     Types: Chew    Tobacco comment: "DIP"   Substance Use Topics    Alcohol use: Yes     Comment: 1/2 - 1 pint daily    Drug use: Not Currently       Family History  Positive for a pertinent family history of colon cancer in his father diagnosed over the age of " "60.    Review of Systems  General ROS: negative for chills, fever or weight loss  Psychological ROS: negative for hallucination, depression or suicidal ideation  Ophthalmic ROS: negative for blurry vision, photophobia or eye pain  ENT ROS: negative for epistaxis, sore throat or rhinorrhea  Respiratory ROS: no cough, shortness of breath, or wheezing  Cardiovascular ROS: no chest pain or dyspnea on exertion  Gastrointestinal ROS: positive for abdominal pain, hematochezia, hematemesis  Genito-Urinary ROS: no dysuria,  or hematuria.  Positive for trouble voiding  Musculoskeletal ROS: negative for gait disturbance or muscular weakness  Neurological ROS: no syncope or seizures; no ataxia  Dermatological ROS: negative for pruritis, rash and jaundice    Physical Examination  /73   Pulse 107   Temp 97.9 °F (36.6 °C) (Oral)   Resp 18   Ht 5' 8" (1.727 m)   Wt 97.5 kg (215 lb)   SpO2 96%   BMI 32.69 kg/m²   General appearance: alert, cooperative, no distress  HENT: Normocephalic, atraumatic, neck symmetrical, no nasal discharge   Eyes: conjunctivae/corneas clear, PERRL, EOM's intact  Lungs: clear to auscultation bilaterally, no dullness to percussion bilaterally  Heart: regular rate and rhythm without rub; no displacement of the PMI   Abdomen: soft, TTP in the suprapubic area and LLQ; bowel sounds normoactive; no organomegaly  Extremities: extremities symmetric; no clubbing, cyanosis, or edema  Integument: Skin color, texture, turgor normal; no rashes; hair distrubution normal  Neurologic: Alert and oriented X 3, moving all four extremities, intact sensation to light touch  Psychiatric: no pressured speech; normal affect; no evidence of impaired cognition     Labs:  Lab Results   Component Value Date    WBC 11.65 01/07/2022    HGB 15.9 01/07/2022    HCT 45.8 01/07/2022    MCV 89 01/07/2022     01/07/2022         CMP  Sodium   Date Value Ref Range Status   01/07/2022 143 136 - 145 mmol/L Final     Potassium "   Date Value Ref Range Status   01/07/2022 3.6 3.5 - 5.1 mmol/L Final     Chloride   Date Value Ref Range Status   01/07/2022 104 95 - 110 mmol/L Final     CO2   Date Value Ref Range Status   01/07/2022 22 (L) 23 - 29 mmol/L Final     Glucose   Date Value Ref Range Status   01/07/2022 111 (H) 70 - 110 mg/dL Final     BUN   Date Value Ref Range Status   01/07/2022 11 6 - 20 mg/dL Final     Creatinine   Date Value Ref Range Status   01/07/2022 0.9 0.5 - 1.4 mg/dL Final     Calcium   Date Value Ref Range Status   01/07/2022 8.9 8.7 - 10.5 mg/dL Final     Total Protein   Date Value Ref Range Status   01/07/2022 7.9 6.0 - 8.4 g/dL Final     Albumin   Date Value Ref Range Status   01/07/2022 4.0 3.5 - 5.2 g/dL Final     Total Bilirubin   Date Value Ref Range Status   01/07/2022 1.0 0.1 - 1.0 mg/dL Final     Comment:     For infants and newborns, interpretation of results should be based  on gestational age, weight and in agreement with clinical  observations.    Premature Infant recommended reference ranges:  Up to 24 hours.............<8.0 mg/dL  Up to 48 hours............<12.0 mg/dL  3-5 days..................<15.0 mg/dL  6-29 days.................<15.0 mg/dL       Alkaline Phosphatase   Date Value Ref Range Status   01/07/2022 87 55 - 135 U/L Final     AST   Date Value Ref Range Status   01/07/2022 44 (H) 10 - 40 U/L Final     ALT   Date Value Ref Range Status   01/07/2022 52 (H) 10 - 44 U/L Final     Anion Gap   Date Value Ref Range Status   01/07/2022 17 (H) 8 - 16 mmol/L Final     eGFR if    Date Value Ref Range Status   01/07/2022 >60 >60 mL/min/1.73 m^2 Final     eGFR if non    Date Value Ref Range Status   01/07/2022 >60 >60 mL/min/1.73 m^2 Final     Comment:     Calculation used to obtain the estimated glomerular filtration  rate (eGFR) is the CKD-EPI equation.          Imaging: CXR 1/7/22-Clear lung fields bilaterally, no pneumothorax    I have personally reviewed and interpreted  these images.    Assessment:   The patient is a 52 yo man with past medical history of Feng's esophagus, uncontrolled GERD, daily chronic NSAID use, daily alcohol use without known cirrhosis who presents with three days of lower abdominal pain, hematemesis and hematochezia.  He is currently hemodynamically stable, his H/H is stable.  His INR is prolonged. Alcohol level and lactic acid elevated at presentation. He has not had evidence of GI bleeding since his presentation.  CTA abdomen was negative for active bleeding and diverticulitis.    Plan:   -Continue Octreotide and high dose IV PPI.  -Cipro started for antibiotic prophylaxis in this patient with possible cirrhosis and GI bleeding (PCN allergy)  -Monitor H/H, transfuse as needed.  -Continue supportive care.  -Keep the patient NPO.    -Please alert the GI team for evidence of GI bleeding.  -Monitor for alcohol withdrawal.    Gerda Resendiz MD

## 2022-01-08 NOTE — PROGRESS NOTES
Discharge instructions reviewed with patient. Meds reviewed, follow up  to be scheduled with GI and  lucas informed given

## 2022-01-08 NOTE — SUBJECTIVE & OBJECTIVE
"Past Medical History:   Diagnosis Date    Alcohol abuse     Bloody stools     Hematemesis/vomiting blood        Past Surgical History:   Procedure Laterality Date    APPENDECTOMY      CHOLECYSTECTOMY      FOOT SURGERY Right     KNEE SURGERY Right        Review of patient's allergies indicates:   Allergen Reactions    Penicillins        No current facility-administered medications on file prior to encounter.     Current Outpatient Medications on File Prior to Encounter   Medication Sig    gabapentin (NEURONTIN) 300 MG capsule Take 300 mg by mouth as needed.    ibuprofen (ADVIL,MOTRIN) 200 MG tablet Take 200 mg by mouth every 6 (six) hours as needed for Pain.    omeprazole (PRILOSEC) 20 MG capsule Take 20 mg by mouth once daily.    [DISCONTINUED] OXcarbazepine (TRILEPTAL) 600 MG Tab Take 600 mg by mouth 2 (two) times a day.     Family History    None       Tobacco Use    Smoking status: Never Smoker    Smokeless tobacco: Current User     Types: Chew    Tobacco comment: "DIP"   Substance and Sexual Activity    Alcohol use: Yes     Comment: 1/2 - 1 pint daily    Drug use: Not Currently    Sexual activity: Yes     Partners: Female     Review of Systems   Constitutional: Negative for chills and fever.   Eyes: Negative for photophobia and visual disturbance.   Respiratory: Negative for cough and shortness of breath.    Cardiovascular: Negative for chest pain, palpitations and leg swelling.   Gastrointestinal: Positive for abdominal pain, blood in stool, nausea and vomiting. Negative for diarrhea.   Genitourinary: Positive for difficulty urinating. Negative for frequency, hematuria and urgency.   Skin: Negative for pallor, rash and wound.   Neurological: Negative for light-headedness and headaches.   Psychiatric/Behavioral: Negative for confusion and decreased concentration.     Objective:     Vital Signs (Most Recent):  Temp: 97.9 °F (36.6 °C) (01/07/22 1231)  Pulse: 107 (01/07/22 1758)  Resp: 18 " (01/07/22 1830)  BP: 123/73 (01/07/22 1732)  SpO2: 96 % (01/07/22 1758) Vital Signs (24h Range):  Temp:  [97.9 °F (36.6 °C)] 97.9 °F (36.6 °C)  Pulse:  [] 107  Resp:  [16-22] 18  SpO2:  [95 %-99 %] 96 %  BP: (119-155)/(59-87) 123/73     Weight: 97.5 kg (215 lb)  Body mass index is 32.69 kg/m².    Physical Exam  Vitals and nursing note reviewed.   Constitutional:       General: He is not in acute distress.     Appearance: He is well-developed and well-nourished.   HENT:      Head: Normocephalic and atraumatic.      Right Ear: External ear normal.      Left Ear: External ear normal.      Nose: Nose normal.      Mouth/Throat:      Mouth: Oropharynx is clear and moist.   Eyes:      Extraocular Movements: EOM normal.      Conjunctiva/sclera: Conjunctivae normal.      Pupils: Pupils are equal, round, and reactive to light.   Cardiovascular:      Rate and Rhythm: Normal rate and regular rhythm.      Pulses: Intact distal pulses.   Pulmonary:      Effort: Pulmonary effort is normal. No respiratory distress.      Breath sounds: Normal breath sounds. No wheezing or rales.   Abdominal:      General: Bowel sounds are normal. There is no distension.      Palpations: Abdomen is soft.      Tenderness: There is abdominal tenderness in the suprapubic area.      Comments: No palpable hepatomegaly or splenomegaly   Musculoskeletal:         General: No tenderness or edema. Normal range of motion.      Cervical back: Normal range of motion and neck supple.   Skin:     General: Skin is warm and dry.   Neurological:      Mental Status: He is alert and oriented to person, place, and time.   Psychiatric:         Mood and Affect: Mood and affect normal.         Thought Content: Thought content normal.           CRANIAL NERVES     CN III, IV, VI   Pupils are equal, round, and reactive to light.  Extraocular motions are normal.        Significant Labs: All pertinent labs within the past 24 hours have been reviewed.    Significant  Imaging: I have reviewed all pertinent imaging results/findings within the past 24 hours.

## 2022-01-08 NOTE — ASSESSMENT & PLAN NOTE
BMI Body mass index is 32.69 kg/m².  Patient counseled on risks obesity imparts on overall health. Urged toward diet and lifestyle modifications to aid in weight reduction.

## 2022-01-08 NOTE — H&P
[facility] Medicine  History & Physical    Patient Name: Kevin Montanez  MRN: [id number]  Patient Class: OP- Observation  Admission Date: 1/7/2022  Attending Physician: Rusty Paz MD   Primary Care Provider: IRVING Mcdowell         Patient information was obtained from patient, past medical records and ER records.     Subjective:     Principal Problem:Hematemesis with nausea    Chief Complaint:   Chief Complaint   Patient presents with    Abdominal Pain     EMS called to 50yo male with lower abdominal pain, vomiting blood and having bloody stools x3 days. Hx of heavy alcohol use per patient. EMS stated that he had a syncopal episode enroute that lasted about a minute and then he woke up and was alert and oriented x4 till arrival and at triage.         HPI: 51 y.o. 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  Acute onset of symptoms, rated as severe.  He reports drinking heavily daily.  Has had an EGD, but thinks it has been greater than 5 years.  Denies fever, chills, cough, SOB, chest pain, palpitations, orthopnea, PND, dizziness, syncope.  In the ED, he was noted to have an elevated INR of 2.7 and an elevated lactic acid of 6.1.  H/H within normal limits and no further bleeding observed in the ED.  only minimal elevation of AST/ALT.  Alcohol level 274. States his last drink was yesterday.  Otherwise labs, UA, COVID test, chest x-ray, CTA abdomen pelvis all unremarkable for acute abnormality.  He received IV fluids and lactic is starting to improve.  Gastroenterology was consulted.  Placed in observation.      Past Medical History:   Diagnosis Date    Alcohol abuse     Bloody stools     Hematemesis/vomiting blood        Past Surgical History:   Procedure Laterality Date     "APPENDECTOMY      CHOLECYSTECTOMY      FOOT SURGERY Right     KNEE SURGERY Right        Review of patient's allergies indicates:   Allergen Reactions    Penicillins        No current facility-administered medications on file prior to encounter.     Current Outpatient Medications on File Prior to Encounter   Medication Sig    gabapentin (NEURONTIN) 300 MG capsule Take 300 mg by mouth as needed.    ibuprofen (ADVIL,MOTRIN) 200 MG tablet Take 200 mg by mouth every 6 (six) hours as needed for Pain.    omeprazole (PRILOSEC) 20 MG capsule Take 20 mg by mouth once daily.    [DISCONTINUED] OXcarbazepine (TRILEPTAL) 600 MG Tab Take 600 mg by mouth 2 (two) times a day.     Family History    None       Tobacco Use    Smoking status: Never Smoker    Smokeless tobacco: Current User     Types: Chew    Tobacco comment: "DIP"   Substance and Sexual Activity    Alcohol use: Yes     Comment: 1/2 - 1 pint daily    Drug use: Not Currently    Sexual activity: Yes     Partners: Female     Review of Systems   Constitutional: Negative for chills and fever.   Eyes: Negative for photophobia and visual disturbance.   Respiratory: Negative for cough and shortness of breath.    Cardiovascular: Negative for chest pain, palpitations and leg swelling.   Gastrointestinal: Positive for abdominal pain, blood in stool, nausea and vomiting. Negative for diarrhea.   Genitourinary: Positive for difficulty urinating. Negative for frequency, hematuria and urgency.   Skin: Negative for pallor, rash and wound.   Neurological: Negative for light-headedness and headaches.   Psychiatric/Behavioral: Negative for confusion and decreased concentration.     Objective:     Vital Signs (Most Recent):  Temp: 97.9 °F (36.6 °C) (01/07/22 1231)  Pulse: 107 (01/07/22 1758)  Resp: 18 (01/07/22 1830)  BP: 123/73 (01/07/22 1732)  SpO2: 96 % (01/07/22 1758) Vital Signs (24h Range):  Temp:  [97.9 °F (36.6 °C)] 97.9 °F (36.6 °C)  Pulse:  [] 107  Resp:  " [16-22] 18  SpO2:  [95 %-99 %] 96 %  BP: (119-155)/(59-87) 123/73     Weight: 97.5 kg (215 lb)  Body mass index is 32.69 kg/m².    Physical Exam  Vitals and nursing note reviewed.   Constitutional:       General: He is not in acute distress.     Appearance: He is well-developed and well-nourished.   HENT:      Head: Normocephalic and atraumatic.      Right Ear: External ear normal.      Left Ear: External ear normal.      Nose: Nose normal.      Mouth/Throat:      Mouth: Oropharynx is clear and moist.   Eyes:      Extraocular Movements: EOM normal.      Conjunctiva/sclera: Conjunctivae normal.      Pupils: Pupils are equal, round, and reactive to light.   Cardiovascular:      Rate and Rhythm: Normal rate and regular rhythm.      Pulses: Intact distal pulses.   Pulmonary:      Effort: Pulmonary effort is normal. No respiratory distress.      Breath sounds: Normal breath sounds. No wheezing or rales.   Abdominal:      General: Bowel sounds are normal. There is no distension.      Palpations: Abdomen is soft.      Tenderness: There is abdominal tenderness in the suprapubic area.      Comments: No palpable hepatomegaly or splenomegaly   Musculoskeletal:         General: No tenderness or edema. Normal range of motion.      Cervical back: Normal range of motion and neck supple.   Skin:     General: Skin is warm and dry.   Neurological:      Mental Status: He is alert and oriented to person, place, and time.   Psychiatric:         Mood and Affect: Mood and affect normal.         Thought Content: Thought content normal.           CRANIAL NERVES     CN III, IV, VI   Pupils are equal, round, and reactive to light.  Extraocular motions are normal.        Significant Labs: All pertinent labs within the past 24 hours have been reviewed.    Significant Imaging: I have reviewed all pertinent imaging results/findings within the past 24 hours.    Assessment/Plan:     * Hematemesis with nausea  Concerning history with alcohol use,  INR elevated but otherwise lab work indicates no acute liver issues.  H/H within normal limits and hemodynamically stable.  Continue PPI, octreotide, and Cipro.  NPO.  Gastroenterology consulted, appreciate recs.    Class 1 obesity in adult  BMI Body mass index is 32.69 kg/m².  Patient counseled on risks obesity imparts on overall health. Urged toward diet and lifestyle modifications to aid in weight reduction.      Alcohol dependence  Received IV lorazepam in the ED, continue MVI/folate/thiamine/diazepam.  Monitor for withdrawal.    Tobacco user  5 minutes spent counseling the patient on tobacco cessation and he is not currently ready to stop smoking. He will be offereded a nicotine transdermal patch while hospitalized and monitored for withdrawal.  Will provide additional tobacco cessation counseling prior to discharge.     Gastroesophageal reflux disease  PPI    Bipolar disorder  Continue home regimen of oxcarbazepine      VTE Risk Mitigation (From admission, onward)         Ordered     Reason for No Pharmacological VTE Prophylaxis  Once        Question:  Reasons:  Answer:  Active Bleeding    01/07/22 1617     IP VTE HIGH RISK PATIENT  Once         01/07/22 1617     Place sequential compression device  Until discontinued         01/07/22 1617               As clarification, on 01/07/2022, patient should be placed in hospital observation services under my care in collaboration with MD Rashad Gerber Jr., APRN, AGACNP-BC  Hospitalist - Department of Hospital Medicine  [facility] [address] [place] [address]  Office #: [phone]; Pager #: [phone]

## 2022-01-08 NOTE — PLAN OF CARE
[facility] - Med Surg  Discharge Final Note    Patient ready for discharge from case management stand point.     Primary Care Provider: IRVING Mcdowell    Expected Discharge Date: 1/8/2022    Final Discharge Note (most recent)     Final Note - 01/08/22 1107        Final Note    Assessment Type Final Discharge Note     Anticipated Discharge Disposition Home or Self Care     What phone number can be called within the next 1-3 days to see how you are doing after discharge? [phone]     Hospital Resources/Appts/Education Provided Appointments scheduled by Navigator/Coordinator;Provided patient/caregiver with written discharge plan information;Appointments scheduled and added to AVS;Provided education on problems/symptoms using teachback        Post-Acute Status    Discharge Delays None known at this time                 Important Message from Medicare             Contact Info     IRVING Mcdowell   Relationship: PCP - General    [place] [facility]  [address] [address]       Next Steps: Schedule an appointment as soon as possible for a visit in 1 week(s)    Instructions: Follow up with primary care     [facility] - Gastroenterology   Specialty: Gastroenterology    [address]  [place] [address]   Phone: [phone]       Next Steps: Schedule an appointment as soon as possible for a visit    Instructions:  Follow up with GI back home to proceed with EGD and colonoscopy    [facility]    [address]  [place] [address]   Phone: [phone]       Next Steps: Follow up    Instructions: Primary care       .HOW TO MANAGE YOUR CARE  AT HOME:      HELP AT HOME TO ASSIST WITH PATIENT'S RECOVERY IS Nelibernardo Montanez, wife.        [place] taught patient about things he is responsible for when discharged TO HELP WITH HIS RECOVERY:  How to Manage His Care At Home:  1. Getting his prescriptions filled.  2. Taking his medications as directed. DO NOT MISS ANY DOSES!  3. Going to his follow-up  doctor appointments.   .

## 2022-01-08 NOTE — ASSESSMENT & PLAN NOTE
5 minutes spent counseling the patient on tobacco cessation and he is not currently ready to stop smoking. He will be offereded a nicotine transdermal patch while hospitalized and monitored for withdrawal.  Will provide additional tobacco cessation counseling prior to discharge.

## 2022-01-08 NOTE — PROGRESS NOTES
Progress Note  Gastroenterology    Admit Date: 1/7/2022   LOS: 0 days     SUBJECTIVE:     Follow-up For:  No further hematemesis or hematochezia. Had a soft BM today. Continues to have lower abdominal pain although improved.    CTA report from yesterday reviewed.    Scheduled Meds:   ciprofloxacin  400 mg Intravenous Q12H    diazePAM  5 mg Oral Q8H    folic acid  1 mg Oral Daily    gabapentin  300 mg Oral BID    multivitamin  1 tablet Oral Daily    OXcarbazepine  600 mg Oral BID    pantoprazole  40 mg Intravenous BID    phytonadione  10 mg Oral Once    thiamine  100 mg Oral Daily     Continuous Infusions:   sodium chloride 0.9% 100 mL/hr at 01/07/22 1830    octreotide (SANDOSTATIN) infusion 50 mcg/hr (01/08/22 0307)     PRN Meds:oxyCODONE-acetaminophen    Review of patient's allergies indicates:   Allergen Reactions    Penicillins          OBJECTIVE:     Vital Signs (Most Recent)  Temp: 98.6 °F (37 °C) (01/08/22 0735)  Pulse: 98 (01/08/22 0735)  Resp: 19 (01/08/22 0735)  BP: (!) 143/79 (01/08/22 0735)  SpO2: 98 % (01/08/22 0735)    Temperature Range Min/Max (Last 24H):  Temp:  [97.9 °F (36.6 °C)-99 °F (37.2 °C)]     I & O (Last 24H):    Intake/Output Summary (Last 24 hours) at 1/8/2022 0927  Last data filed at 1/7/2022 2354  Gross per 24 hour   Intake 3000 ml   Output 200 ml   Net 2800 ml     Physical Exam:  General: well developed, well nourished   Abdomen: Obese soft with lower abdominal tenderness, no guarding or riidity  LUE: iv infiltration with erythema    Laboratory:  CBC:   Recent Labs   Lab 01/08/22  0527   WBC 8.43   RBC 4.96   HGB 15.5   HCT 44.5      MCV 90   MCH 31.3*   MCHC 34.8     BMP:   Recent Labs   Lab 01/07/22  1308   *      K 3.6      CO2 22*   BUN 11   CREATININE 0.9   CALCIUM 8.9     CMP:   Recent Labs   Lab 01/07/22  1308   *   CALCIUM 8.9   ALBUMIN 4.0   PROT 7.9      K 3.6   CO2 22*      BUN 11   CREATININE 0.9   ALKPHOS 87   ALT 52*    AST 44*   BILITOT 1.0         Diagnostic Results:  CT: I have personally reviewed the report     Discussed plan with primary hospitalist    ASSESSMENT/PLAN:     The patient is a 52 yo man with past medical history of Feng's esophagus, uncontrolled GERD, daily chronic NSAID use, daily alcohol use without known cirrhosis who presents with three days of lower abdominal pain, hematemesis and hematochezia.  He is currently hemodynamically stable, his H/H is stable.  His INR is prolonged. Alcohol level and lactic acid elevated at presentation. He has not had evidence of GI bleeding since his presentation.  CTA abdomen was negative for active bleeding and diverticulitis.    Suspect possible ischemic colitis, improving clinically.       Plan:   -D/C Octreotide  - Clear liquid diet, gradually advance diet as tolerated  - PPI daily  - Alcohol abstinence  - Keep hydrated  - Recommended to follow up with his primary GI back home to proceed with EGD and colonoscopy

## 2022-01-08 NOTE — PLAN OF CARE
01/08/22 0843   Discharge Planning   Assessment Type Discharge Planning Assessment   Resource/Environmental Concerns none   Support Systems Spouse/significant other   Equipment Currently Used at Home none   Current Living Arrangements home/apartment/condo   Care Facility Name From home in [place], here for work   Patient/Family Anticipates Transition to home   Patient/Family Anticipated Services at Transition none   DME Needed Upon Discharge  none   Discharge Plan A Home   Discharge Plan B   (TBD)

## 2022-01-10 LAB
HAV IGG SER QL IA: POSITIVE
HBV CORE AB SERPL QL IA: NEGATIVE
HBV SURFACE AB SER-ACNC: NEGATIVE M[IU]/ML
HBV SURFACE AG SERPL QL IA: NEGATIVE
HCV AB SERPL QL IA: NEGATIVE